# Patient Record
Sex: FEMALE
[De-identification: names, ages, dates, MRNs, and addresses within clinical notes are randomized per-mention and may not be internally consistent; named-entity substitution may affect disease eponyms.]

---

## 2022-11-11 ENCOUNTER — DASHBOARD ENCOUNTERS (OUTPATIENT)
Age: 82
End: 2022-11-11

## 2022-11-11 ENCOUNTER — CLAIMS CREATED FROM THE CLAIM WINDOW (OUTPATIENT)
Dept: URBAN - METROPOLITAN AREA CLINIC 92 | Facility: CLINIC | Age: 82
End: 2022-11-11
Payer: MEDICARE

## 2022-11-11 VITALS
BODY MASS INDEX: 19.07 KG/M2 | SYSTOLIC BLOOD PRESSURE: 160 MMHG | HEIGHT: 61 IN | DIASTOLIC BLOOD PRESSURE: 86 MMHG | TEMPERATURE: 98 F | WEIGHT: 101 LBS | HEART RATE: 62 BPM

## 2022-11-11 DIAGNOSIS — R13.19 ESOPHAGEAL DYSPHAGIA: ICD-10-CM

## 2022-11-11 DIAGNOSIS — R63.4 WEIGHT LOSS: ICD-10-CM

## 2022-11-11 DIAGNOSIS — K21.9 GASTROESOPHAGEAL REFLUX DISEASE WITHOUT ESOPHAGITIS: ICD-10-CM

## 2022-11-11 PROBLEM — 266435005: Status: ACTIVE | Noted: 2022-11-11

## 2022-11-11 PROCEDURE — 99204 OFFICE O/P NEW MOD 45 MIN: CPT

## 2022-11-11 RX ORDER — OMEPRAZOLE 40 MG/1
1 CAPSULE 30 MINUTES BEFORE MORNING MEAL CAPSULE, DELAYED RELEASE ORAL ONCE A DAY
Qty: 90 | Refills: 0 | OUTPATIENT
Start: 2022-11-11

## 2022-11-11 NOTE — HPI-TODAY'S VISIT:
82 year old female patient presents for dysphagia. Sx have been present for months and has been getting worse. Patients daughter is here today, and she notes patient was in rehab in August due to covid and tylenol overdose where she had multiple chocking episodes. At first this was with solids but now it is with liquids as well. Eventually the food and liquid will go down but patient is having to massage her throat. She does have a h/o esophageal dilations in the past one being 15 years ago and the most recent one 7 years ago. When she was hospitalized in August a barium swallow as done which was normal but sx have gotten worse since then. She also notes some odynophagia. Has GERD sx daily which started 1 month ago and takes omeprazole as needed. Denies any nausea or vomiting.  Denies any prior tobacco use. She was an alcoholic and rarely drinks now. Uses Advil 400 mg TID for joint pain. 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  Has 1 BM daily usually normal. Has some diarrhea today but daughter states this is normal when she is stressed. No new meds. Denies melena, hematochezia. Has had colonoscopy years ago and denies any h/o colon polyps.  She also notes weight loss of 20 pounds since August since she has not been able to eat.  No fam h/o GI cancers.

## 2022-12-20 ENCOUNTER — OFFICE VISIT (OUTPATIENT)
Dept: URBAN - METROPOLITAN AREA MEDICAL CENTER 12 | Facility: MEDICAL CENTER | Age: 82
End: 2022-12-20
Payer: MEDICARE

## 2022-12-20 DIAGNOSIS — K22.2 ACQUIRED ESOPHAGEAL RING: ICD-10-CM

## 2022-12-20 PROCEDURE — 43248 EGD GUIDE WIRE INSERTION: CPT | Performed by: INTERNAL MEDICINE

## 2022-12-20 RX ORDER — OMEPRAZOLE 40 MG/1
1 CAPSULE 30 MINUTES BEFORE MORNING MEAL CAPSULE, DELAYED RELEASE ORAL ONCE A DAY
Qty: 90 | Refills: 0 | Status: ACTIVE | COMMUNITY
Start: 2022-11-11